# Patient Record
Sex: FEMALE | Race: BLACK OR AFRICAN AMERICAN | NOT HISPANIC OR LATINO
[De-identification: names, ages, dates, MRNs, and addresses within clinical notes are randomized per-mention and may not be internally consistent; named-entity substitution may affect disease eponyms.]

---

## 2020-08-16 ENCOUNTER — TRANSCRIPTION ENCOUNTER (OUTPATIENT)
Age: 20
End: 2020-08-16

## 2021-06-12 ENCOUNTER — TRANSCRIPTION ENCOUNTER (OUTPATIENT)
Age: 21
End: 2021-06-12

## 2021-09-13 ENCOUNTER — TRANSCRIPTION ENCOUNTER (OUTPATIENT)
Age: 21
End: 2021-09-13

## 2021-09-21 ENCOUNTER — NON-APPOINTMENT (OUTPATIENT)
Age: 21
End: 2021-09-21

## 2021-09-21 ENCOUNTER — APPOINTMENT (OUTPATIENT)
Dept: ORTHOPEDIC SURGERY | Facility: CLINIC | Age: 21
End: 2021-09-21
Payer: MEDICAID

## 2021-09-21 PROCEDURE — 99204 OFFICE O/P NEW MOD 45 MIN: CPT

## 2021-09-21 RX ORDER — NAPROXEN 500 MG/1
500 TABLET ORAL
Qty: 42 | Refills: 0 | Status: COMPLETED | COMMUNITY
Start: 2021-09-21 | End: 2021-10-12

## 2021-09-21 NOTE — PHYSICAL EXAM
[de-identified] : Physical Exam:\par General: Well appearing, no acute distress\par Neurologic: A&Ox3, No focal deficits\par Head: NCAT without abrasions, lacerations, or ecchymosis to head, face, or scalp\par Eyes: No scleral icterus, no gross abnormalities\par Respiratory: Equal chest wall expansion bilaterally, no accessory muscle use\par Lymphatic: No lymphadenopathy palpated\par Skin: Warm and dry\par Psychiatric: Normal mood and affect\par \par Left Elbow Exam\par \par Skin: Clean, dry, intact. No ecchymosis. Mild swelling. No palpable joint effusion. \par Painful ROM: flexion to 130, lacking 8-10 degrees of extension. \par Tenderness: [No] medial epicondyle pain. [No] lateral epicondyle pain. [No] olecranon pain.  pain at radial head.\par Strength: 3/5 elbow flexion, 3/5 elbow extension, 3/5 supination, 3/5 pronation\par Stability: Stable to vaus/valgus stress\par Vasc: 2+ radial pulse, <2s cap refill\par Sensation: In tact to light touch throughout\par Neuro: Negative tinels at ulnar canal, AIN/PIN/Ulnar nerve in tact to motor/sensation.\par \par Special Tests:\par Dorsiflexion Against Resistance: Negative\par Flexion of Wrist Against Resistance: Negative\par Resistance Against Pronation: positive \par Resistance Against Supination: positive \par Tinel's Sign Cubital Tunnel: Negative  [de-identified] : EXAM: ELBOW LEFT\par PROCEDURE DATE: 09/13/2021 - [facility]\par \par \par IMPRESSION: There is a mildly displaced intra-articular radial head fracture with associated large joint effusion.\par \par There is no wrist fracture or dislocation identified.

## 2021-09-21 NOTE — ADDENDUM
[FreeTextEntry1] : Documented by Magdaleno Shah acting as a scribe for Dr. Marti on 09/21/2021. \par \par All medical record entries made by the Scribe were at my, Dr. Marti's, direction and\par personally dictated by me on 09/21/2021. I have reviewed the chart and agree that the record\par accurately reflects my personal performance of the history, physical exam, procedure and imaging.

## 2021-09-21 NOTE — HISTORY OF PRESENT ILLNESS
[Stable] : stable [___ wks] : [unfilled] week(s) ago [3] : an average pain level of 3/10 [Bending] : worsened by bending [None] : No relieving factors are noted [de-identified] : NATALIE BISSAINTHE is a 21 year female being seen for initial visit L elbow pain. She presents wearing L shoulder sling. Patient reports KATE as falling backwards onto her L elbow on 09/12/2021. Patient reports she was celebrating her birthday at the time and was intoxicated. She presented the next day to [facility] where x-rays were performed of her L elbow that revealed; a mildly displaced intra-articular radial head fracture with associated large joint effusion, no wrist fracture or dislocation identified. Currently, she notes most pain with elbow bending. She endorses tingling from her L elbow into her L fingers. She reports no relief with advil and tylenol.

## 2021-09-21 NOTE — DISCUSSION/SUMMARY
[de-identified] : NATALIE BISSAINTHE is a 21 year female being seen for initial visit L elbow pain secondary to radial head fx. She presents wearing L shoulder sling. Patient reports KATE as falling backwards onto her L elbow on 09/12/2021. Patient reports she was celebrating her birthday at the time and was intoxicated. She presented the next day to [facility] where x-rays were performed of her L elbow that revealed; a mildly displaced intra-articular radial head fracture with associated large joint effusion, no wrist fracture or dislocation identified. Currently, she notes most pain with elbow bending. She endorses tingling from her L elbow into her L fingers. She reports no relief with advil and tylenol. \par \par We had a thorough discussion regarding the nature of her pain, the pathophysiology, as well as all treatment options. On clinical exam, she has no mechanical blocks, I discussed most radial head fracture without mechanical block do heal non-operatively. I discussed operative and non-operative treatment modalities. Patient and her mother elects to proceed non-operatively.  Patient was given prescription of formal physical therapy that she will perform 2x/wk for 6-8 wks. Conservative measures of treatment include rest, activity avoidance, NSAID's PRN, application to ice to the area 2-3x daily for 20 minutes, with gradual return to activities. A prescription of Naproxen was given and patient was informed about its risks and benefits. She will transition from sling given her flexion and extension, but should be WBAT. Patient will follow up in 2-3 wks for repeat clinical assessment. All questions were answered and the patient and her parent verbalized understanding. The patient and her parent are in agreement with this treatment plan.

## 2021-09-29 ENCOUNTER — TRANSCRIPTION ENCOUNTER (OUTPATIENT)
Age: 21
End: 2021-09-29

## 2021-10-06 ENCOUNTER — NON-APPOINTMENT (OUTPATIENT)
Age: 21
End: 2021-10-06

## 2021-10-12 ENCOUNTER — APPOINTMENT (OUTPATIENT)
Dept: ORTHOPEDIC SURGERY | Facility: CLINIC | Age: 21
End: 2021-10-12
Payer: MEDICAID

## 2021-10-12 PROCEDURE — 99214 OFFICE O/P EST MOD 30 MIN: CPT

## 2021-10-12 PROCEDURE — 73080 X-RAY EXAM OF ELBOW: CPT | Mod: LT

## 2021-10-12 NOTE — PHYSICAL EXAM
[de-identified] : Physical Exam:\par General: Well appearing, no acute distress\par Neurologic: A&Ox3, No focal deficits\par Head: NCAT without abrasions, lacerations, or ecchymosis to head, face, or scalp\par Eyes: No scleral icterus, no gross abnormalities\par Respiratory: Equal chest wall expansion bilaterally, no accessory muscle use\par Lymphatic: No lymphadenopathy palpated\par Skin: Warm and dry\par Psychiatric: Normal mood and affect\par \par Left Elbow Exam\par \par Skin: Clean, dry, intact. No ecchymosis. Mild swelling. No palpable joint effusion. \par Painful ROM: flexion to 135, lacking 2 degrees of extension. \par Tenderness: [No] medial epicondyle pain. [No] lateral epicondyle pain. [No] olecranon pain.  pain at radial head.\par Strength: 4/5 elbow flexion, 4/5 elbow extension, 4/5 supination, 4/5 pronation\par Stability: Stable to vaus/valgus stress\par Vasc: 2+ radial pulse, <2s cap refill\par Sensation: In tact to light touch throughout\par Neuro: Negative tinels at ulnar canal, AIN/PIN/Ulnar nerve in tact to motor/sensation.\par \par Special Tests:\par Dorsiflexion Against Resistance: Negative\par Flexion of Wrist Against Resistance: Negative\par Resistance Against Pronation: positive \par Resistance Against Supination: positive \par Tinel's Sign Cubital Tunnel: Negative \par \par Left Wrist: swelling and focal tenderness over volar aspect, decrease ROM, no bruising, no snuffbox tenderness, full sensation intact, cap refill <2 seconds. [de-identified] : Left elbow xrays taken today reveals:  Mildly displaced intra-articular radial head fracture with callus formation noted. Possible loose body adjacent to area of fx.\par \par Previous wrist xrays normal no fracture.

## 2021-10-12 NOTE — HISTORY OF PRESENT ILLNESS
[Stable] : stable [Bending] : worsened by bending [None] : No relieving factors are noted [___ wks] : [unfilled] week(s) ago [0] : a current pain level of 0/10 [1] : an average pain level of 1/10 [de-identified] : NATALIE BISSAINTHE is a 21 year female being seen for f/u visit L elbow pain secondary to radial head fracture and left wrist pain secondary to sprain/ overuse. She presents without sling. She reports improvement in L elbow pain and function with physical therapy 2x/week. No complaints at this time.

## 2021-10-12 NOTE — DISCUSSION/SUMMARY
[de-identified] : NATALIE BISSAINTHE is a 21 year female being seen for f/u visit L elbow pain secondary to radial head fracture and left wrist pain secondary to sprain/ overuse. She presents without sling. She reports improvement in L elbow pain and function with physical therapy 2x/week. No complaints at this time. \par \par We had a thorough discussion regarding the nature of her pain, the pathophysiology, as well as all treatment options. On clinical exam, she has no mechanical blocks, I discussed most radial head fracture without mechanical block do heal non-operatively. I discussed operative and non-operative treatment modalities. Patient and her mother elects to proceed non-operatively.  Patient was given prescription of formal physical therapy that she will perform 2x/wk for 6-8 wks. Conservative measures of treatment include rest, activity avoidance, NSAID's PRN, application to ice to the area 2-3x daily for 20 minutes, with gradual return to activities. A prescription of Mobic was prescribed to help treat her wrist. Patient will follow up in 2-3 wks for repeat clinical assessment. Prior to her next appointment she will perform a wrist MRI for us to review upon her clinical reassessment. All questions were answered and the patient and her parent verbalized understanding. The patient and her parent are in agreement with this treatment plan.

## 2021-10-27 ENCOUNTER — RX RENEWAL (OUTPATIENT)
Age: 21
End: 2021-10-27

## 2021-11-02 ENCOUNTER — APPOINTMENT (OUTPATIENT)
Dept: ORTHOPEDIC SURGERY | Facility: CLINIC | Age: 21
End: 2021-11-02

## 2021-11-19 ENCOUNTER — APPOINTMENT (OUTPATIENT)
Dept: ORTHOPEDIC SURGERY | Facility: CLINIC | Age: 21
End: 2021-11-19
Payer: MEDICAID

## 2021-11-19 DIAGNOSIS — M77.02 MEDIAL EPICONDYLITIS, LEFT ELBOW: ICD-10-CM

## 2021-11-19 PROCEDURE — 99214 OFFICE O/P EST MOD 30 MIN: CPT

## 2021-11-19 PROCEDURE — 73080 X-RAY EXAM OF ELBOW: CPT | Mod: 26,LT

## 2021-11-19 RX ORDER — MELOXICAM 7.5 MG/1
7.5 TABLET ORAL
Qty: 21 | Refills: 0 | Status: ACTIVE | COMMUNITY
Start: 2021-10-12 | End: 1900-01-01

## 2021-11-19 NOTE — PHYSICAL EXAM
[de-identified] : Physical Exam:\par General: Well appearing, no acute distress\par Neurologic: A&Ox3, No focal deficits\par Head: NCAT without abrasions, lacerations, or ecchymosis to head, face, or scalp\par Eyes: No scleral icterus, no gross abnormalities\par Respiratory: Equal chest wall expansion bilaterally, no accessory muscle use\par Lymphatic: No lymphadenopathy palpated\par Skin: Warm and dry\par Psychiatric: Normal mood and affect\par \par Left Elbow Exam\par \par Skin: Clean, dry, intact. No ecchymosis. No swelling. No palpable joint effusion. \par Painful ROM: flexion to 140, extension of 0, no longer lacking extension \par Tenderness: Medial epicondyle pain. [No] lateral epicondyle pain. [No] olecranon pain. Pain at radial head.\par Strength: 5/5 elbow flexion, 5/5 elbow extension, 5/5 supination, 5/5 pronation\par Stability: Stable to varus/valgus stress\par Vasc: 2+ radial pulse, <2s cap refill\par Sensation: In tact to light touch throughout\par Neuro: Negative tinels at ulnar canal, AIN/PIN/Ulnar nerve in tact to motor/sensation.\par \par Special Tests:\par Dorsiflexion Against Resistance: Negative\par Flexion of Wrist Against Resistance: Negative\par Resistance Against Pronation: NEG\par Resistance Against Supination: NEG \par Tinel's Sign Cubital Tunnel: Negative \par \par Left Wrist: swelling and focal tenderness over volar aspect, decrease ROM, no bruising, no snuffbox tenderness, full sensation intact, cap refill <2 seconds. [de-identified] : Left elbow xrays taken today reveals:  Mildly displaced intra-articular radial head fracture with callus formation noted. Possible loose body adjacent to area of fx. Adequate healing noted to fracture.\par \par Previous wrist xrays normal no fracture.

## 2021-11-19 NOTE — HISTORY OF PRESENT ILLNESS
[Stable] : stable [___ wks] : [unfilled] week(s) ago [0] : a current pain level of 0/10 [1] : an average pain level of 1/10 [Bending] : worsened by bending [None] : No relieving factors are noted [de-identified] : NATALIE BISSAINTHE is a 21 year female being seen for f/u visit L elbow pain secondary to radial head fracture, 9 weeks ago, and left wrist pain secondary to sprain/ overuse. She presents without sling. She reports that she cleaned her bathroom 1 week ago predominantly using her L arm, and is now experiencing painful popping in L elbow. She reports she feels she is no longer making improvements in physical therapy. She endorses con't L wrist pain. She has an MRI scheduled for her L wrist on 11/21/2021.

## 2021-11-19 NOTE — DISCUSSION/SUMMARY
[de-identified] : NATALIE BISSAINTHE is a 21 year female being seen for f/u visit L elbow pain secondary to radial head fracture, 9 weeks ago, and left wrist pain secondary to sprain/ overuse. She presents without sling. She reports that she cleaned her bathroom 1 week ago predominantly using her L arm, and is now experiencing painful popping in L elbow. She reports she feels she is no longer making improvements in physical therapy. She endorses con't L wrist pain. She has an MRI scheduled for her L wrist on 11/21/2021.\par \par With regards to the wrist she was placed in a volar wrist splint today for stabilization. We will review her MRI once obtained alongside further tx options. She will avoid weighted exercises at the wrist as she has been since it is provoking pain in PT. \par \par With regards to her elbow, she recently did a lot of cleaning which provoked medial epicondylitis of which i recommended continuation of PT, full course of meloxicam as prescribed, and not cleaning/ overdoing it with this arm. She was recommended to wear her wrist splint with work and sleep and elbow compression sleeve for elbow support. She will follow up with us in office in 6 weeks for clinical reassessment. She agrees with the above plan and all questions were answered.\par

## 2021-11-21 ENCOUNTER — APPOINTMENT (OUTPATIENT)
Dept: MRI IMAGING | Facility: CLINIC | Age: 21
End: 2021-11-21
Payer: MEDICAID

## 2021-11-21 ENCOUNTER — NON-APPOINTMENT (OUTPATIENT)
Age: 21
End: 2021-11-21

## 2021-11-21 ENCOUNTER — OUTPATIENT (OUTPATIENT)
Dept: OUTPATIENT SERVICES | Facility: HOSPITAL | Age: 21
LOS: 1 days | End: 2021-11-21
Payer: MEDICAID

## 2021-11-21 DIAGNOSIS — Z00.8 ENCOUNTER FOR OTHER GENERAL EXAMINATION: ICD-10-CM

## 2021-11-21 PROCEDURE — 73221 MRI JOINT UPR EXTREM W/O DYE: CPT | Mod: 26,LT

## 2021-11-21 PROCEDURE — 73221 MRI JOINT UPR EXTREM W/O DYE: CPT

## 2021-11-22 ENCOUNTER — NON-APPOINTMENT (OUTPATIENT)
Age: 21
End: 2021-11-22

## 2021-11-23 ENCOUNTER — APPOINTMENT (OUTPATIENT)
Dept: ORTHOPEDIC SURGERY | Facility: CLINIC | Age: 21
End: 2021-11-23

## 2021-11-30 ENCOUNTER — TRANSCRIPTION ENCOUNTER (OUTPATIENT)
Age: 21
End: 2021-11-30

## 2021-11-30 ENCOUNTER — APPOINTMENT (OUTPATIENT)
Age: 21
End: 2021-11-30
Payer: MEDICAID

## 2021-11-30 DIAGNOSIS — S63.502A UNSPECIFIED SPRAIN OF LEFT WRIST, INITIAL ENCOUNTER: ICD-10-CM

## 2021-11-30 PROCEDURE — 73080 X-RAY EXAM OF ELBOW: CPT | Mod: LT

## 2021-11-30 PROCEDURE — 99214 OFFICE O/P EST MOD 30 MIN: CPT

## 2021-12-01 PROBLEM — S63.502A SPRAIN OF WRIST, LEFT: Status: ACTIVE | Noted: 2021-10-12

## 2021-12-01 NOTE — ADDENDUM
[FreeTextEntry1] : Documented by Magdaleno Shah acting as a scribe for Dr. Marti on 11/30/2021. \par \par All medical record entries made by the Scribe were at my, Dr. Marti's, direction and\par personally dictated by me on 11/30/2021. I have reviewed the chart and agree that the record\par accurately reflects my personal performance of the history, physical exam, procedure and imaging.

## 2021-12-01 NOTE — DISCUSSION/SUMMARY
[de-identified] : NATALIE BISSAINTHE is a 21 year female being seen for f/u visit L elbow pain secondary to radial head fracture, 11 weeks ago, and left wrist pain secondary to sprain/ overuse. She presents without sling. She reports mild stiffness in extension. Of note, she has not seen Dr. Gaona for wrist pain specifically scaphoid pain. \par \par We had a thorough discussion regarding the nature of her pain, the pathophysiology, as well as all treatment options. In regards to her Left wrist, patient will follow up with Dr. Gaona. We spoke with Dr. Gaona's PA, who is aware of this patient condition. \par \par In regards to her Left elbow, At this point, patient ROM has greatly improved, and her pain has subsided, she is doing well and happy with her progress so far. Conservative measures of treatment include rest until asymptomatic, activity avoidance, NSAID's PRN, application to ice to the area 2-3x daily for 20 minutes, with gradual return to activities. Patient will follow up in 3-4 months for repeat clinical assessment. All questions were answered and the patient verbalized understanding. The patient is in agreement with this treatment plan.

## 2021-12-01 NOTE — PHYSICAL EXAM
[de-identified] : Physical Exam:\par General: Well appearing, no acute distress\par Neurologic: A&Ox3, No focal deficits\par Head: NCAT without abrasions, lacerations, or ecchymosis to head, face, or scalp\par Eyes: No scleral icterus, no gross abnormalities\par Respiratory: Equal chest wall expansion bilaterally, no accessory muscle use\par Lymphatic: No lymphadenopathy palpated\par Skin: Warm and dry\par Psychiatric: Normal mood and affect\par \par Left Elbow Exam\par \par Skin: Clean, dry, intact. No ecchymosis. No swelling. No palpable joint effusion. \par Painful ROM: flexion to 140, extension of 0, no longer lacking extension, but resistance with full extension. \par Tenderness: Medial epicondyle pain. [No] lateral epicondyle pain. [No] olecranon pain. Pain at radial head.\par Strength: 5/5 elbow flexion, 5/5 elbow extension, 5/5 supination, 5/5 pronation\par Stability: Stable to varus/valgus stress\par Vasc: 2+ radial pulse, <2s cap refill\par Sensation: In tact to light touch throughout\par Neuro: Negative tinels at ulnar canal, AIN/PIN/Ulnar nerve in tact to motor/sensation.\par \par Special Tests:\par Dorsiflexion Against Resistance: Negative\par Flexion of Wrist Against Resistance: Negative\par Resistance Against Pronation: NEG\par Resistance Against Supination: NEG \par Tinel's Sign Cubital Tunnel: Negative \par \par Left Wrist: swelling and focal tenderness over volar aspect, decrease ROM, no bruising, no snuffbox tenderness, full sensation intact, cap refill <2 seconds. [de-identified] : Left elbow xrays taken today reveals:  Mildly displaced intra-articular radial head fracture with callus formation noted.  Adequate healing noted to fracture.\par \par Previous wrist xrays normal no fracture.

## 2021-12-08 ENCOUNTER — APPOINTMENT (OUTPATIENT)
Dept: ORTHOPEDIC SURGERY | Facility: CLINIC | Age: 21
End: 2021-12-08
Payer: MEDICAID

## 2021-12-08 PROCEDURE — 99214 OFFICE O/P EST MOD 30 MIN: CPT

## 2021-12-08 PROCEDURE — 73110 X-RAY EXAM OF WRIST: CPT | Mod: LT

## 2021-12-10 DIAGNOSIS — Z01.818 ENCOUNTER FOR OTHER PREPROCEDURAL EXAMINATION: ICD-10-CM

## 2021-12-16 ENCOUNTER — TRANSCRIPTION ENCOUNTER (OUTPATIENT)
Age: 21
End: 2021-12-16

## 2021-12-16 NOTE — ASU PATIENT PROFILE, ADULT - PATIENT'S HEIGHT AND WEIGHT RECORDED IN THE VITAL SIGNS FLOWSHEET
Please notify patient that echocardiogram results done 5/29/2019 reviewed with Dr. Simpson. Echocardiogram results are stable. No change when compared to previous study from 12/04/2018. Patient to follow up as scheduled with Dr. Simpson on 6/19/2019 to further discuss these results.    yes

## 2021-12-16 NOTE — ASU PATIENT PROFILE, ADULT - FALL HARM RISK - UNIVERSAL INTERVENTIONS
Bed in lowest position, wheels locked, appropriate side rails in place/Call bell, personal items and telephone in reach/Instruct patient to call for assistance before getting out of bed or chair/Non-slip footwear when patient is out of bed/[place] to call system/Physically safe environment - no spills, clutter or unnecessary equipment/Purposeful Proactive Rounding/Room/bathroom lighting operational, light cord in reach

## 2021-12-16 NOTE — ASU PATIENT PROFILE, ADULT - NSICDXPASTMEDICALHX_GEN_ALL_CORE_FT
PAST MEDICAL HISTORY:  Elbow fracture, left radial head    Wrist fracture left     PAST MEDICAL HISTORY:  Elbow fracture, left radial head    Ovarian cyst     Wrist fracture left     PAST MEDICAL HISTORY:  Elbow fracture, left radial head    Ovarian cyst     Scaphoid fracture     Wrist fracture left

## 2021-12-17 ENCOUNTER — APPOINTMENT (OUTPATIENT)
Dept: ORTHOPEDIC SURGERY | Facility: HOSPITAL | Age: 21
End: 2021-12-17

## 2021-12-17 ENCOUNTER — OUTPATIENT (OUTPATIENT)
Dept: INPATIENT UNIT | Facility: HOSPITAL | Age: 21
LOS: 1 days | Discharge: HOME | End: 2021-12-17
Payer: MEDICAID

## 2021-12-17 VITALS
DIASTOLIC BLOOD PRESSURE: 87 MMHG | TEMPERATURE: 97 F | RESPIRATION RATE: 15 BRPM | HEART RATE: 82 BPM | WEIGHT: 150.36 LBS | SYSTOLIC BLOOD PRESSURE: 113 MMHG | HEIGHT: 65 IN | OXYGEN SATURATION: 100 %

## 2021-12-17 VITALS
SYSTOLIC BLOOD PRESSURE: 110 MMHG | RESPIRATION RATE: 13 BRPM | HEART RATE: 65 BPM | TEMPERATURE: 98 F | OXYGEN SATURATION: 100 % | DIASTOLIC BLOOD PRESSURE: 70 MMHG

## 2021-12-17 DIAGNOSIS — S62.025A NONDISPLACED FRACTURE OF MIDDLE THIRD OF NAVICULAR [SCAPHOID] BONE OF LEFT WRIST, INITIAL ENCOUNTER FOR CLOSED FRACTURE: ICD-10-CM

## 2021-12-17 LAB — HCG UR QL: NEGATIVE — SIGNIFICANT CHANGE UP

## 2021-12-17 PROCEDURE — C1713: CPT

## 2021-12-17 PROCEDURE — 76000 FLUOROSCOPY <1 HR PHYS/QHP: CPT

## 2021-12-17 PROCEDURE — C1889: CPT

## 2021-12-17 PROCEDURE — C1769: CPT

## 2021-12-17 PROCEDURE — 81025 URINE PREGNANCY TEST: CPT

## 2021-12-17 PROCEDURE — 25440 REPAIR NONU SCPHD CARPL B1: CPT | Mod: LT

## 2021-12-17 RX ORDER — OXYCODONE HYDROCHLORIDE 5 MG/1
10 TABLET ORAL ONCE
Refills: 0 | Status: DISCONTINUED | OUTPATIENT
Start: 2021-12-17 | End: 2021-12-17

## 2021-12-17 RX ORDER — SODIUM CHLORIDE 9 MG/ML
1000 INJECTION, SOLUTION INTRAVENOUS
Refills: 0 | Status: DISCONTINUED | OUTPATIENT
Start: 2021-12-17 | End: 2021-12-17

## 2021-12-17 RX ORDER — OXYCODONE HYDROCHLORIDE 5 MG/1
1 TABLET ORAL
Qty: 20 | Refills: 0
Start: 2021-12-17 | End: 2021-12-21

## 2021-12-17 RX ORDER — ONDANSETRON 8 MG/1
1 TABLET, FILM COATED ORAL
Qty: 20 | Refills: 0
Start: 2021-12-17 | End: 2021-12-21

## 2021-12-17 RX ORDER — MELOXICAM 15 MG/1
1 TABLET ORAL
Qty: 0 | Refills: 0 | DISCHARGE

## 2021-12-17 RX ORDER — DOCUSATE SODIUM 100 MG
1 CAPSULE ORAL
Qty: 21 | Refills: 0
Start: 2021-12-17 | End: 2021-12-23

## 2021-12-17 RX ORDER — ONDANSETRON 8 MG/1
4 TABLET, FILM COATED ORAL ONCE
Refills: 0 | Status: DISCONTINUED | OUTPATIENT
Start: 2021-12-17 | End: 2021-12-17

## 2021-12-17 RX ORDER — FENTANYL CITRATE 50 UG/ML
50 INJECTION INTRAVENOUS
Refills: 0 | Status: DISCONTINUED | OUTPATIENT
Start: 2021-12-17 | End: 2021-12-17

## 2021-12-17 RX ORDER — OXYCODONE HYDROCHLORIDE 5 MG/1
5 TABLET ORAL ONCE
Refills: 0 | Status: DISCONTINUED | OUTPATIENT
Start: 2021-12-17 | End: 2021-12-17

## 2021-12-17 RX ADMIN — FENTANYL CITRATE 50 MICROGRAM(S): 50 INJECTION INTRAVENOUS at 18:04

## 2021-12-17 RX ADMIN — OXYCODONE HYDROCHLORIDE 5 MILLIGRAM(S): 5 TABLET ORAL at 17:21

## 2021-12-17 RX ADMIN — FENTANYL CITRATE 50 MICROGRAM(S): 50 INJECTION INTRAVENOUS at 17:21

## 2021-12-17 RX ADMIN — OXYCODONE HYDROCHLORIDE 5 MILLIGRAM(S): 5 TABLET ORAL at 18:04

## 2021-12-17 NOTE — ASU DISCHARGE PLAN (ADULT/PEDIATRIC) - CARE PROVIDER_API CALL
Judith Gaona)  [facility]  [address] [place] [address]  Phone: [phone]  Fax: [phone]  Follow Up Time:

## 2021-12-17 NOTE — ASU DISCHARGE PLAN (ADULT/PEDIATRIC) - NS MD DC FALL RISK RISK
For information on Fall & Injury Prevention, visit: [email or web address] OR  [email or web address] OR  https://www.cdc.gov/steadi/patient.html

## 2021-12-17 NOTE — ASU DISCHARGE PLAN (ADULT/PEDIATRIC) - ASU DC SPECIAL INSTRUCTIONSFT
1. Keep bandage on for 5 days. Then you can remove and get it wet. Otherwise cover hand with plastic bag for showering.    2. If you have a splint on, keep it on until you see Dr. Gaona in the office. Do not get the splint wet because it will brak down and not good for your skin.    3. Elevate hand as much as possible and wiggle fingers as much as you can, as often as you think of it (if you are watchin TV every [place] wiggle 10 times, or rading a book wiggle 10 times after every 5 pages read). The exception is if you have a splint you will not be able to wiggle the affected digit. Try to wiggle the free ones though.    4. Walk plenty, no sitting around.    5. See Dr. Gaona in the office in about 7-10 days. Call to schedule. You will have you wound checked then, any sutures will be removed, and you splint will be changed if you have one on.

## 2021-12-22 ENCOUNTER — APPOINTMENT (OUTPATIENT)
Dept: ORTHOPEDIC SURGERY | Facility: CLINIC | Age: 21
End: 2021-12-22

## 2021-12-24 DIAGNOSIS — S62.002K UNSPECIFIED FRACTURE OF NAVICULAR [SCAPHOID] BONE OF LEFT WRIST, SUBSEQUENT ENCOUNTER FOR FRACTURE WITH NONUNION: ICD-10-CM
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
## 2021-12-27 PROBLEM — S62.109A FRACTURE OF UNSPECIFIED CARPAL BONE, UNSPECIFIED WRIST, INITIAL ENCOUNTER FOR CLOSED FRACTURE: Chronic | Status: ACTIVE | Noted: 2021-12-16

## 2021-12-27 PROBLEM — N83.209 UNSPECIFIED OVARIAN CYST, UNSPECIFIED SIDE: Chronic | Status: ACTIVE | Noted: 2021-12-17

## 2021-12-27 PROBLEM — S42.402A UNSPECIFIED FRACTURE OF LOWER END OF LEFT HUMERUS, INITIAL ENCOUNTER FOR CLOSED FRACTURE: Chronic | Status: ACTIVE | Noted: 2021-12-16

## 2021-12-27 PROBLEM — S62.009A UNSPECIFIED FRACTURE OF NAVICULAR [SCAPHOID] BONE OF UNSPECIFIED WRIST, INITIAL ENCOUNTER FOR CLOSED FRACTURE: Chronic | Status: ACTIVE | Noted: 2021-12-17

## 2021-12-28 ENCOUNTER — LAB VISIT (OUTPATIENT)
Dept: LAB | Facility: HOSPITAL | Age: 21
End: 2021-12-28
Payer: OTHER GOVERNMENT

## 2021-12-28 DIAGNOSIS — Z20.822 ENCOUNTER FOR LABORATORY TESTING FOR COVID-19 VIRUS: ICD-10-CM

## 2021-12-28 LAB — SARS-COV-2 RNA RESP QL NAA+PROBE: DETECTED

## 2021-12-28 PROCEDURE — U0003 INFECTIOUS AGENT DETECTION BY NUCLEIC ACID (DNA OR RNA); SEVERE ACUTE RESPIRATORY SYNDROME CORONAVIRUS 2 (SARS-COV-2) (CORONAVIRUS DISEASE [COVID-19]), AMPLIFIED PROBE TECHNIQUE, MAKING USE OF HIGH THROUGHPUT TECHNOLOGIES AS DESCRIBED BY CMS-2020-01-R: HCPCS | Performed by: INTERNAL MEDICINE

## 2021-12-28 PROCEDURE — U0005 INFEC AGEN DETEC AMPLI PROBE: HCPCS | Performed by: INTERNAL MEDICINE

## 2022-01-05 ENCOUNTER — APPOINTMENT (OUTPATIENT)
Age: 22
End: 2022-01-05
Payer: MEDICAID

## 2022-01-05 DIAGNOSIS — S52.125A NONDISPLACED FRACTURE OF HEAD OF LEFT RADIUS, INITIAL ENCOUNTER FOR CLOSED FRACTURE: ICD-10-CM

## 2022-01-05 PROCEDURE — 73110 X-RAY EXAM OF WRIST: CPT | Mod: LT

## 2022-01-05 PROCEDURE — 99024 POSTOP FOLLOW-UP VISIT: CPT

## 2022-01-05 NOTE — PHYSICAL EXAM
[Normal Touch] : sensation intact for  touch [Normal] : no peripheral adenopathy appreciated [de-identified] : Left wrist: \par Skin intact, mild swelling, no ecchymosis, no gross deformity. \par +TTP snuffbox, nontender distal radius, nontender ulna. \par ROM of the digits intact, patient able to make a composite fist. ROM of the wrist slightly limited 2/2 pain. \par sensation intact distally, cap refill < 2 sec  [de-identified] : millerr [de-identified] : MRI of the left wrist reviewed taken on 11/12/21 reveals a transversely oriented fracture at the scaphoid waist with adjacent soft tissue edema. 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

## 2022-01-05 NOTE — ASSESSMENT
[FreeTextEntry1] : 21-year-old female status post  injury in September of 2021 with a fracture of theleft scaphoid waist. 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 Risks and benefits of continued conservative closed treatment versus surgical intervention for ORIF with distal radius autograft were discussed at length the patient she would like to proceed with surgery. She will be booked for the procedure and will remain nonweightbearing with the left upper extremity until that time.

## 2022-01-05 NOTE — HISTORY OF PRESENT ILLNESS
[FreeTextEntry1] : 21 year female presents for evaluation of a left wrist injury s/p mechanical fall on 9/13/21. She has been under the care of Dr. Marti for her left elbow radial head fracture. She reports improvement in her elbow pain, with persistent left wrist pain. MRI was ordered to evaluate, revealing a scaphoid fracture. She was placed into a wrist brace and recommended follow up here for evaluation and treatment. Currently, she has continued pain following use of the brace for the last three weeks. She denies regular use of pain medication. She presents for evaluation, MRI review and management of the fracture.

## 2022-01-05 NOTE — HISTORY OF PRESENT ILLNESS
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 She has been compliant with his splint and has kept it clean dry and intact. Her pain is controlled without narcotic medication. [de-identified] : range of motion of the fingers intact without pain. [de-identified] : 4 xray views of the left wrist are reviewed, slight distal screw prominence [de-identified] : 21-year-old female status post rightscaphoid nonunion ORIF doing well. There is slight hardware prominent on x-ray today the screw was inserted under direct visualizationand there was no screw prominence intraoperatively. I recommend cast immobilization and nonweightbearing left upper extremity a bone scan will be ordered for use when she is transitioned to a fracture brace. Followup in 3 weeks for cast removal and repeat x-rays.

## 2022-01-16 ENCOUNTER — TRANSCRIPTION ENCOUNTER (OUTPATIENT)
Age: 22
End: 2022-01-16

## 2022-01-26 ENCOUNTER — APPOINTMENT (OUTPATIENT)
Dept: ORTHOPEDIC SURGERY | Facility: CLINIC | Age: 22
End: 2022-01-26

## 2022-01-28 ENCOUNTER — APPOINTMENT (OUTPATIENT)
Dept: ORTHOPEDIC SURGERY | Facility: CLINIC | Age: 22
End: 2022-01-28
Payer: MEDICAID

## 2022-01-28 PROCEDURE — 99024 POSTOP FOLLOW-UP VISIT: CPT

## 2022-01-28 PROCEDURE — 73110 X-RAY EXAM OF WRIST: CPT | Mod: LT

## 2022-01-28 NOTE — HISTORY OF PRESENT ILLNESS
[___ Weeks Post Op] : [unfilled] weeks post op [Clean/Dry/Intact] : clean, dry and intact [Neuro Intact] : an unremarkable neurological exam [Vascular Intact] : ~T peripheral vascular exam normal [Good Overall Alignment] : good overall alignment [Doing Well] : is doing well [Excellent Pain Control] : has excellent pain control [No Sign of Infection] : is showing no signs of infection [Healed] : healed [Chills] : no chills [Fever] : no fever [Nausea] : no nausea [Vomiting] : no vomiting [Erythema] : not erythematous [Swelling] : not swollen [Dehiscence] : not dehisced [de-identified] : Procedure: ORIF left wrist scaphoid nonunion. 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 She has been compliant with his cast  and has kept it clean dry and intact. Her pain is controlled without narcotic medication. She presents for cast removal and repeat imaging.  [de-identified] : Cast removed, skin intact. well healing incision. range of motion of the fingers intact without pain. ROM of the wrist reduced 2/2 stiffness. +mild TTP fracture site.  [de-identified] : 4 xray views of the left wrist are reviewed, interval callus formation.  [de-identified] : 21-year-old female status post rights caphoid nonunion ORIF doing well. She has been transitioned to a fracture brace to be worn at all times. She will avoid lifting or weight bearing with the Left UE. I have also recommended bone stimulation to ensure bone healing of the nonunion. She will fu in the office for bone stim fitting, and in four weeks for reevaluation.

## 2022-02-25 ENCOUNTER — APPOINTMENT (OUTPATIENT)
Dept: ORTHOPEDIC SURGERY | Facility: CLINIC | Age: 22
End: 2022-02-25
Payer: MEDICAID

## 2022-02-25 PROCEDURE — 99024 POSTOP FOLLOW-UP VISIT: CPT

## 2022-02-25 NOTE — HISTORY OF PRESENT ILLNESS
[___ Weeks Post Op] : [unfilled] weeks post op [Clean/Dry/Intact] : clean, dry and intact [Healed] : healed [Neuro Intact] : an unremarkable neurological exam [Vascular Intact] : ~T peripheral vascular exam normal [Good Overall Alignment] : good overall alignment [Doing Well] : is doing well [Excellent Pain Control] : has excellent pain control [No Sign of Infection] : is showing no signs of infection [Chills] : no chills [Fever] : no fever [Nausea] : no nausea [Vomiting] : no vomiting [Erythema] : not erythematous [Swelling] : not swollen [Dehiscence] : not dehisced [de-identified] : Procedure: ORIF left wrist scaphoid nonunion. 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 Patient reports she is doing well. Has been compliant with fracture brace. She reports improved symptoms. Just got approved for a bone stimulator and is awaiting its arrival.  [de-identified] : Left wrist exam: \par Well healing incision. range of motion of the fingers intact without pain. ROM of the wrist reduced 2/2 stiffness. +mild TTP fracture site.  [de-identified] : 4 xray views of the left wrist are reviewed, interval callus formation, hardware in good position [de-identified] : 21-year-old female  status post right scaphoid nonunion ORIF doing well. Patient can begin to wean out of fracture brace as tolerated. She may progress with light activities as tolerated. No heavy lifting. She will perform a HEP for gentle wrist ROM. She may use the bone stimulator as instructed. She will follow up in 4 weeks for ROM check and repeat xrays. All questions and concerns were addressed with the patient and they are in agreement with this plan.

## 2022-03-01 ENCOUNTER — TRANSCRIPTION ENCOUNTER (OUTPATIENT)
Age: 22
End: 2022-03-01

## 2022-03-01 ENCOUNTER — APPOINTMENT (OUTPATIENT)
Dept: ORTHOPEDIC SURGERY | Facility: CLINIC | Age: 22
End: 2022-03-01

## 2022-03-25 ENCOUNTER — APPOINTMENT (OUTPATIENT)
Dept: ORTHOPEDIC SURGERY | Facility: CLINIC | Age: 22
End: 2022-03-25
Payer: MEDICAID

## 2022-03-25 PROCEDURE — 73110 X-RAY EXAM OF WRIST: CPT | Mod: LT

## 2022-03-25 PROCEDURE — 99213 OFFICE O/P EST LOW 20 MIN: CPT

## 2022-03-25 NOTE — HISTORY OF PRESENT ILLNESS
[___ Weeks Post Op] : [unfilled] weeks post op [Clean/Dry/Intact] : clean, dry and intact [Healed] : healed [Neuro Intact] : an unremarkable neurological exam [Vascular Intact] : ~T peripheral vascular exam normal [Good Overall Alignment] : good overall alignment [Doing Well] : is doing well [Excellent Pain Control] : has excellent pain control [No Sign of Infection] : is showing no signs of infection [Chills] : no chills [Fever] : no fever [Nausea] : no nausea [Vomiting] : no vomiting [Erythema] : not erythematous [Swelling] : not swollen [Dehiscence] : not dehisced [de-identified] : Procedure: ORIF left wrist scaphoid nonunion. 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 Patient reports she is doing well. She denies pain at this time, and reports improvement in mobility.  [de-identified] : Left wrist exam: \par Well healing incision. range of motion of the fingers intact without pain. ROM of the wrist intact.  no TTP fracture site.  [de-identified] : 4 xray views of the left wrist are reviewed, callus formation, hardware in good position [de-identified] : 21-year-old female 14 weeks status post right scaphoid nonunion ORIF doing well. She has been recommended for continued use of the bone stim for the next 8 weeks. She will begin light activity as this time. She will fu in 8 weeks for reevaluation.

## 2022-05-31 ENCOUNTER — APPOINTMENT (OUTPATIENT)
Dept: ORTHOPEDIC SURGERY | Facility: CLINIC | Age: 22
End: 2022-05-31

## 2022-07-12 ENCOUNTER — APPOINTMENT (OUTPATIENT)
Dept: ORTHOPEDIC SURGERY | Facility: CLINIC | Age: 22
End: 2022-07-12

## 2022-07-12 DIAGNOSIS — S62.025K NONDISPLACED FRACTURE OF MIDDLE THIRD OF NAVICULAR [SCAPHOID] BONE OF LEFT WRIST, SUBSEQUENT ENCOUNTER FOR FRACTURE WITH NONUNION: ICD-10-CM

## 2022-07-12 PROCEDURE — 99212 OFFICE O/P EST SF 10 MIN: CPT

## 2022-07-12 PROCEDURE — 73110 X-RAY EXAM OF WRIST: CPT | Mod: LT

## 2022-07-12 NOTE — ADDENDUM
[FreeTextEntry1] : IQueenie assisted with documentation on 07/12/2022 acting as scribe for Dr. Judith Gaona.

## 2022-07-12 NOTE — ASSESSMENT
[FreeTextEntry1] : 21 year old female presents for reevaluation of left wrist approximately 7 months status post left ORIF wrist scaphoid nonunion with concern of injury. \par \par Nature and history of the condition was discussed at length. I discussed with the patient that she has healed radiographically and is clear to return to activity as tolerated. I recommended she modify exercise as needed. She will follow up with me on an as needed basis. \par \par All questions and concerns have been addressed, and the patient is in agreement with the above plan.

## 2022-07-12 NOTE — PHYSICAL EXAM
[Normal] : Oriented to person, place, and time, insight and judgement were intact and the affect was normal [de-identified] : Left wrist exam:\par Skin: Clean, dry, intact. No ecchymosis. No swelling. No gross deformity.\par ROM: Intact\par Tenderness: No tenderness to palpation at the distal radius, distal ulna, the DRUJ. No pain at the scapholunate interval. No snuffbox pain. \par Strength: 5/5 wrist flexion, 5/5 wrist extension, 5/5 supination, 5/5 pronation\par Stability: Stable DRUJ. Negative Anton's. \par Vasc: 2+ radial pulse, <2s cap refill\par Sensation: Intact to light touch throughout\par Neuro: Negative tinels over median nerve, AIN/PIN/Ulnar nerve in tact to motor/sensation.  [de-identified] : The following radiographs were ordered and read by me during this patients visit. I reviewed each radiograph in detail with the patient and discussed my findings as highlighted below. \par \par 3 x-ray views of the left wrist reveal well healed scaphoid fracture.

## 2022-07-12 NOTE — HISTORY OF PRESENT ILLNESS
[FreeTextEntry1] : 7/12/2022: Patient presents for reevaluation of left wrist approximately 7 months status post left ORIF wrist scaphoid nonunion. Patient reports she has returned to all gym activity. She reports she heard an audible popping sound from the wrist with associated pain recently while lifting weights. She has stopped lifting since. She presents today for evaluation of the wrist to rule out injury. She also complains of occasional tingling of the left digits with activity.

## 2023-01-14 ENCOUNTER — NON-APPOINTMENT (OUTPATIENT)
Age: 23
End: 2023-01-14

## 2023-02-11 ENCOUNTER — APPOINTMENT (OUTPATIENT)
Dept: OBGYN | Facility: CLINIC | Age: 23
End: 2023-02-11

## 2023-04-24 NOTE — HISTORY OF PRESENT ILLNESS
[Stable] : stable [0] : a current pain level of 0/10 [1] : an average pain level of 1/10 [Bending] : worsened by bending [None] : No relieving factors are noted [___ wks] : [unfilled] week(s) ago [de-identified] : NATALIE BISSAINTHE is a 21 year female being seen for f/u visit L elbow pain secondary to radial head fracture, 11 weeks ago, and left wrist pain secondary to sprain/ overuse. She presents without sling. She reports mild stiffness in extension. Of note, she has not seen Dr. Gaona for wrist pain specifically scaphoid pain.  disorganized

## 2023-05-01 ENCOUNTER — NON-APPOINTMENT (OUTPATIENT)
Age: 23
End: 2023-05-01